# Patient Record
Sex: FEMALE | Race: BLACK OR AFRICAN AMERICAN | URBAN - METROPOLITAN AREA
[De-identification: names, ages, dates, MRNs, and addresses within clinical notes are randomized per-mention and may not be internally consistent; named-entity substitution may affect disease eponyms.]

---

## 2023-01-31 PROBLEM — K58.1 IRRITABLE BOWEL SYNDROME WITH CONSTIPATION: Status: ACTIVE | Noted: 2023-01-31

## 2023-01-31 PROBLEM — H61.23 IMPACTED CERUMEN OF BOTH EARS: Status: ACTIVE | Noted: 2023-01-31

## 2023-01-31 PROBLEM — E55.9 VITAMIN D INSUFFICIENCY: Status: ACTIVE | Noted: 2023-01-31

## 2023-01-31 PROBLEM — M79.673 PAINS, FOOT: Status: ACTIVE | Noted: 2023-01-31

## 2023-01-31 PROBLEM — L73.9 FOLLICULITIS: Status: ACTIVE | Noted: 2023-01-31

## 2023-01-31 PROBLEM — N18.31 STAGE 3A CHRONIC KIDNEY DISEASE (MULTI): Status: ACTIVE | Noted: 2023-01-31

## 2023-01-31 PROBLEM — R09.81 NASAL CONGESTION WITH RHINORRHEA: Status: ACTIVE | Noted: 2023-01-31

## 2023-01-31 PROBLEM — Z99.89 CPAP (CONTINUOUS POSITIVE AIRWAY PRESSURE) DEPENDENCE: Status: ACTIVE | Noted: 2023-01-31

## 2023-01-31 PROBLEM — E78.1 HIGH TRIGLYCERIDES: Status: ACTIVE | Noted: 2023-01-31

## 2023-01-31 PROBLEM — M41.9 SCOLIOSIS: Status: ACTIVE | Noted: 2023-01-31

## 2023-01-31 PROBLEM — R93.89 ABNORMAL FINDINGS ON IMAGING TEST: Status: ACTIVE | Noted: 2023-01-31

## 2023-01-31 PROBLEM — H61.20 EXCESSIVE CERUMEN IN EAR CANAL: Status: ACTIVE | Noted: 2023-01-31

## 2023-01-31 PROBLEM — R79.89 ELEVATED SERUM CREATININE: Status: ACTIVE | Noted: 2023-01-31

## 2023-01-31 PROBLEM — N92.1 PROLONGED MENSTRUATION: Status: ACTIVE | Noted: 2023-01-31

## 2023-01-31 PROBLEM — R05.9 COUGH: Status: ACTIVE | Noted: 2023-01-31

## 2023-01-31 PROBLEM — Z86.2 HISTORY OF ANEMIA: Status: ACTIVE | Noted: 2023-01-31

## 2023-01-31 PROBLEM — R73.01 IMPAIRED FASTING GLUCOSE: Status: ACTIVE | Noted: 2023-01-31

## 2023-01-31 PROBLEM — R21 SKIN RASH: Status: ACTIVE | Noted: 2023-01-31

## 2023-01-31 PROBLEM — N92.6 IRREGULAR MENSTRUAL CYCLE: Status: ACTIVE | Noted: 2023-01-31

## 2023-01-31 PROBLEM — R63.5 ABNORMAL WEIGHT GAIN: Status: ACTIVE | Noted: 2023-01-31

## 2023-01-31 PROBLEM — L70.9 ACNE: Status: ACTIVE | Noted: 2023-01-31

## 2023-01-31 PROBLEM — Q90.9 TRISOMY 21, DOWN SYNDROME (HHS-HCC): Status: ACTIVE | Noted: 2023-01-31

## 2023-01-31 PROBLEM — E88.819 INSULIN RESISTANCE: Status: ACTIVE | Noted: 2023-01-31

## 2023-01-31 PROBLEM — M40.46 EXAGGERATED LUMBAR LORDOSIS: Status: ACTIVE | Noted: 2023-01-31

## 2023-01-31 PROBLEM — J30.2 SEASONAL ALLERGIES: Status: ACTIVE | Noted: 2023-01-31

## 2023-01-31 PROBLEM — L83 ACANTHOSIS NIGRICANS: Status: ACTIVE | Noted: 2023-01-31

## 2023-01-31 PROBLEM — R94.120 ABNORMAL OTOACOUSTIC EMISSIONS TEST: Status: ACTIVE | Noted: 2023-01-31

## 2023-01-31 PROBLEM — N94.6 DYSMENORRHEA IN ADOLESCENT: Status: ACTIVE | Noted: 2023-01-31

## 2023-01-31 PROBLEM — J34.89 NASAL CONGESTION WITH RHINORRHEA: Status: ACTIVE | Noted: 2023-01-31

## 2023-01-31 PROBLEM — K59.00 CONSTIPATION: Status: ACTIVE | Noted: 2023-01-31

## 2023-01-31 PROBLEM — E78.6 LOW HDL (UNDER 40): Status: ACTIVE | Noted: 2023-01-31

## 2023-01-31 PROBLEM — M21.42 FLAT FEET, BILATERAL: Status: ACTIVE | Noted: 2023-01-31

## 2023-01-31 PROBLEM — K13.0 ANGULAR CHEILITIS: Status: ACTIVE | Noted: 2023-01-31

## 2023-01-31 PROBLEM — M21.41 FLAT FEET, BILATERAL: Status: ACTIVE | Noted: 2023-01-31

## 2023-01-31 PROBLEM — M21.40 ACQUIRED FLEXIBLE PES PLANUS: Status: ACTIVE | Noted: 2023-01-31

## 2023-01-31 PROBLEM — H69.90 ETD (EUSTACHIAN TUBE DYSFUNCTION): Status: ACTIVE | Noted: 2023-01-31

## 2023-01-31 PROBLEM — G47.33 OBSTRUCTIVE SLEEP APNEA SYNDROME: Status: ACTIVE | Noted: 2023-01-31

## 2023-01-31 PROBLEM — M54.9 BACK PAIN: Status: ACTIVE | Noted: 2023-01-31

## 2023-01-31 PROBLEM — J02.9 SORE THROAT: Status: ACTIVE | Noted: 2023-01-31

## 2023-01-31 PROBLEM — J01.90 ACUTE SINUSITIS: Status: ACTIVE | Noted: 2023-01-31

## 2023-01-31 PROBLEM — Q21.0 VSD (VENTRICULAR SEPTAL DEFECT) (HHS-HCC): Status: ACTIVE | Noted: 2023-01-31

## 2023-01-31 RX ORDER — CICLOPIROX 1 G/100ML
SHAMPOO TOPICAL NIGHTLY
COMMUNITY
Start: 2021-07-21

## 2023-01-31 RX ORDER — LUBIPROSTONE 24 UG/1
24 CAPSULE ORAL
COMMUNITY
Start: 2022-07-07 | End: 2023-04-25 | Stop reason: ALTCHOICE

## 2023-01-31 RX ORDER — POLYETHYLENE GLYCOL 3350 17 G/17G
17 POWDER, FOR SOLUTION ORAL
COMMUNITY
Start: 2014-12-10

## 2023-01-31 RX ORDER — IBUPROFEN 100 MG/5ML
SUSPENSION, ORAL (FINAL DOSE FORM) ORAL
COMMUNITY

## 2023-01-31 RX ORDER — KETOCONAZOLE 20 MG/ML
SHAMPOO, SUSPENSION TOPICAL
COMMUNITY
Start: 2021-07-27

## 2023-01-31 RX ORDER — DOCUSATE SODIUM 100 MG/1
100 CAPSULE, LIQUID FILLED ORAL NIGHTLY
COMMUNITY
Start: 2022-05-05 | End: 2023-04-25 | Stop reason: ALTCHOICE

## 2023-01-31 RX ORDER — CHOLECALCIFEROL (VITAMIN D3) 25 MCG
1 TABLET ORAL DAILY
COMMUNITY
Start: 2022-08-04 | End: 2023-04-25 | Stop reason: ALTCHOICE

## 2023-01-31 RX ORDER — CETIRIZINE HYDROCHLORIDE 10 MG/1
10 TABLET ORAL DAILY PRN
COMMUNITY
Start: 2021-05-29 | End: 2023-04-25 | Stop reason: ALTCHOICE

## 2023-01-31 RX ORDER — BENZOYL PEROXIDE 50 MG/ML
LIQUID TOPICAL
COMMUNITY
Start: 2022-07-28 | End: 2023-04-25 | Stop reason: ALTCHOICE

## 2023-01-31 RX ORDER — ACETAMINOPHEN 500 MG
1 TABLET ORAL
COMMUNITY
Start: 2018-11-07

## 2023-03-12 PROBLEM — L85.3 DRY SKIN DERMATITIS: Status: ACTIVE | Noted: 2023-03-12

## 2023-03-12 PROBLEM — E78.5 DYSLIPIDEMIA WITH HIGH LDL AND LOW HDL: Status: ACTIVE | Noted: 2023-03-12

## 2023-03-12 PROBLEM — G47.21 DELAYED SLEEP PHASE SYNDROME: Status: ACTIVE | Noted: 2023-03-12

## 2023-03-13 ENCOUNTER — APPOINTMENT (OUTPATIENT)
Dept: PRIMARY CARE | Facility: CLINIC | Age: 23
End: 2023-03-13
Payer: COMMERCIAL

## 2023-04-25 ENCOUNTER — OFFICE VISIT (OUTPATIENT)
Dept: PRIMARY CARE | Facility: CLINIC | Age: 23
End: 2023-04-25
Payer: COMMERCIAL

## 2023-04-25 VITALS
HEIGHT: 59 IN | HEART RATE: 82 BPM | BODY MASS INDEX: 39.47 KG/M2 | SYSTOLIC BLOOD PRESSURE: 120 MMHG | OXYGEN SATURATION: 97 % | DIASTOLIC BLOOD PRESSURE: 70 MMHG | WEIGHT: 195.8 LBS

## 2023-04-25 DIAGNOSIS — Q21.0 VSD (VENTRICULAR SEPTAL DEFECT) (HHS-HCC): ICD-10-CM

## 2023-04-25 DIAGNOSIS — K58.1 IRRITABLE BOWEL SYNDROME WITH CONSTIPATION: Primary | ICD-10-CM

## 2023-04-25 DIAGNOSIS — G47.33 OBSTRUCTIVE SLEEP APNEA SYNDROME: ICD-10-CM

## 2023-04-25 DIAGNOSIS — H61.21 IMPACTED CERUMEN OF RIGHT EAR: ICD-10-CM

## 2023-04-25 DIAGNOSIS — N18.2 STAGE 2 CHRONIC KIDNEY DISEASE: ICD-10-CM

## 2023-04-25 DIAGNOSIS — Q90.9 TRISOMY 21, DOWN SYNDROME (HHS-HCC): ICD-10-CM

## 2023-04-25 PROCEDURE — 1036F TOBACCO NON-USER: CPT | Performed by: PHYSICIAN ASSISTANT

## 2023-04-25 PROCEDURE — 99214 OFFICE O/P EST MOD 30 MIN: CPT | Performed by: PHYSICIAN ASSISTANT

## 2023-04-25 RX ORDER — LORATADINE 10 MG/1
10 TABLET ORAL DAILY
COMMUNITY

## 2023-04-25 ASSESSMENT — PATIENT HEALTH QUESTIONNAIRE - PHQ9
SUM OF ALL RESPONSES TO PHQ9 QUESTIONS 1 AND 2: 0
1. LITTLE INTEREST OR PLEASURE IN DOING THINGS: NOT AT ALL
2. FEELING DOWN, DEPRESSED OR HOPELESS: NOT AT ALL

## 2023-04-25 NOTE — PROGRESS NOTES
"Subjective   Maricruz Estrada is a 22 y.o. female who presents for Follow-up (Stomach bothering her on and off).    HPI 22-year-old female presenting with her mother as new patient for establishment of care.  Former patient of colleague, Dr. Rudd, last seen November 2022.  She has a history of trisomy 21 and therefore is a poor historian.  Patient's mother offers most of the history.     Patient's mother states a few days ago she pressed on Maricruz's abdomen (she thinks LUQ) and she said it hurt her. She also believes that she may be slightly bloated. Her BMs are usually soft like a \"mud pie,\" but she grunts when she has a BM per mother. She denies any blood in the stool or black stool (Maricruz sends mom pictures on her phone when she goes). She does follow with GI, last seen 6/17/2022. On MiraLAX     VSD: Follows with [facility], last seen several years ago and plans to schedule a follow-up.    REFUGIO: compliant with CPAP use. Previously followed with sleep medicine    CKD, stage II: Following with [facility], Dr. Marga Key, last seen 5 days ago. CMP 5/5/2022 showed GFR 76, creatinine 1.07, BUN 11. She updated labs at her appt, but they are not able to be reviewed yet.    /70   Pulse 82   Ht 1.499 m (4' 11\")   Wt 88.8 kg (195 lb 12.8 oz)   SpO2 97%   BMI 39.55 kg/m²   Objective   Physical Exam  Vitals reviewed.   Constitutional:       General: She is not in acute distress.     Appearance: Normal appearance. She is not ill-appearing.   HENT:      Head: Normocephalic and atraumatic.      Right Ear: There is impacted cerumen.      Left Ear: There is no impacted cerumen.   Eyes:      General: No scleral icterus.     Extraocular Movements: Extraocular movements intact.      Conjunctiva/sclera: Conjunctivae normal.      Pupils: Pupils are equal, round, and reactive to light.   Cardiovascular:      Rate and Rhythm: Normal rate and regular rhythm.      Heart sounds: Normal heart sounds. No murmur " heard.     No friction rub. No gallop.   Pulmonary:      Effort: Pulmonary effort is normal. No respiratory distress.      Breath sounds: Normal breath sounds. No stridor. No wheezing, rhonchi or rales.   Musculoskeletal:      Cervical back: Normal range of motion.      Right lower leg: No edema.      Left lower leg: No edema.   Skin:     General: Skin is warm and dry.   Neurological:      Mental Status: She is alert and oriented to person, place, and time. Mental status is at baseline.      Cranial Nerves: No cranial nerve deficit.      Gait: Gait normal.   Psychiatric:         Mood and Affect: Mood normal.         Behavior: Behavior normal.       Assessment/Plan   Problem List Items Addressed This Visit       Impacted cerumen of right ear     Debrox eardrops sent to pharmacy.  Encouraged to schedule ENT appointment if no success with eardrops         Relevant Medications    carbamide peroxide (Debrox) 6.5 % otic solution    Irritable bowel syndrome with constipation - Primary     On exam, soft nontender abdomen.  Continue use of MiraLAX daily to promote regular, soft bowel movements.  Follow-up with GI         Obstructive sleep apnea syndrome     Compliant with nightly CPAP use.  Follows with sleep medicine         Trisomy 21, Down syndrome     Stable, independent with ADLs. Lives with her supportive family.          VSD (ventricular septal defect)     Stable. Following with [facility] cardiology, will schedule an appt         Stage 2 chronic kidney disease     Stable, following with [facility] nephrology.  Labs updated at her last appointment 5 days ago.  Avoid nephrotoxic medications, drink adequate water

## 2023-04-27 PROBLEM — N18.31 STAGE 3A CHRONIC KIDNEY DISEASE (MULTI): Status: RESOLVED | Noted: 2023-01-31 | Resolved: 2023-04-27

## 2023-04-27 PROBLEM — R21 SKIN RASH: Status: RESOLVED | Noted: 2023-01-31 | Resolved: 2023-04-27

## 2023-04-27 PROBLEM — J02.9 SORE THROAT: Status: RESOLVED | Noted: 2023-01-31 | Resolved: 2023-04-27

## 2023-04-27 PROBLEM — N18.2 STAGE 2 CHRONIC KIDNEY DISEASE: Status: ACTIVE | Noted: 2023-04-27

## 2023-04-27 PROBLEM — H61.20 EXCESSIVE CERUMEN IN EAR CANAL: Status: RESOLVED | Noted: 2023-01-31 | Resolved: 2023-04-27

## 2023-04-27 PROBLEM — H61.21 IMPACTED CERUMEN OF RIGHT EAR: Status: ACTIVE | Noted: 2023-01-31

## 2023-04-27 NOTE — ASSESSMENT & PLAN NOTE
Stable, following with [facility] nephrology.  Labs updated at her last appointment 5 days ago.  Avoid nephrotoxic medications, drink adequate water

## 2023-04-27 NOTE — ASSESSMENT & PLAN NOTE
On exam, soft nontender abdomen.  Continue use of MiraLAX daily to promote regular, soft bowel movements.  Follow-up with GI

## 2023-04-27 NOTE — ASSESSMENT & PLAN NOTE
Debrox eardrops sent to pharmacy.  Encouraged to schedule ENT appointment if no success with eardrops

## 2023-10-24 ENCOUNTER — APPOINTMENT (OUTPATIENT)
Dept: PRIMARY CARE | Facility: CLINIC | Age: 23
End: 2023-10-24
Payer: COMMERCIAL

## 2023-11-17 ENCOUNTER — APPOINTMENT (OUTPATIENT)
Dept: PRIMARY CARE | Facility: CLINIC | Age: 23
End: 2023-11-17
Payer: COMMERCIAL

## 2023-11-29 ENCOUNTER — OFFICE VISIT (OUTPATIENT)
Dept: PRIMARY CARE | Facility: CLINIC | Age: 23
End: 2023-11-29
Payer: COMMERCIAL

## 2023-11-29 VITALS
WEIGHT: 197 LBS | DIASTOLIC BLOOD PRESSURE: 78 MMHG | SYSTOLIC BLOOD PRESSURE: 122 MMHG | HEIGHT: 59 IN | BODY MASS INDEX: 39.72 KG/M2 | OXYGEN SATURATION: 98 % | HEART RATE: 85 BPM

## 2023-11-29 DIAGNOSIS — Z76.89 ENCOUNTER TO ESTABLISH CARE: Primary | ICD-10-CM

## 2023-11-29 DIAGNOSIS — Z00.00 PREVENTATIVE HEALTH CARE: ICD-10-CM

## 2023-11-29 PROCEDURE — 1036F TOBACCO NON-USER: CPT | Performed by: STUDENT IN AN ORGANIZED HEALTH CARE EDUCATION/TRAINING PROGRAM

## 2023-11-29 PROCEDURE — 99395 PREV VISIT EST AGE 18-39: CPT | Performed by: STUDENT IN AN ORGANIZED HEALTH CARE EDUCATION/TRAINING PROGRAM

## 2023-11-29 NOTE — PROGRESS NOTES
Subjective   Patient ID: Maricruz Estrada is a 23 y.o. female CKD II, VSD, Down syndrome, obesity, and sleep apnea who presents to Establish Care.    HPI  Concerns today:  #Weight  -Mom states that she recently beat breast cancer, dad has had multiple cancers, so have not been working out as much  -Plans to start exercising more and eating fewer carbs    Chronic issues:  #CKD stage II  -Saw Dr. Wakefield at [facility] 10/12/23  -Plans to continue to monitor RFP, CBC, vitamin D level, PTH and Cystatin C every 6 months    #VSD  -echo 12/2/21--Moderate sized perimembranous VSD, functionally tiny due to significant redundant tricuspid valve tissue obstructing the VSD with two tiny jets of residual flow.   -Last seen by Dolly Higginbotham on 12/2/21, cardiologist at [facility]   -Due for follow up next year     #Obesity  -[facility] at [facility]-- seen in June 2023, Dr. Barfield  -Initial weight loss of about 15 lbs then stopped with the recent cancer diagnosis in the family   -Working on sleep hygiene, improving physical activity-- planning to walk on track, mindful eating, fewer carbs   -TSH, celiac screen, liver enzymes, HgbA1c, fasting lipids reassuring    #Sleep apnea  -Uses CPAP every night    #Down syndrome  1. Behavior-- keep an eye on any possible regression, maybe some forgetfulness, no change in personality  2. Dementia-- no issues  3. Diabetes-- A1c 4.6 in June 2023   4. Cardiovascular Disease-- due for cardiology follow up, see above  5. 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 Osteoporosis-- no issues  8. Thyroid- TSH 0.797 on 6/23/23   9. Celiac Disease- Tested and negative in June 2023     Health Maintenance:  Colon cancer screening: Colonoscopy at 45?-- will need to ask father's age when diagnosed with colon cancer   Breast cancer screening: Mammogram at 40? -- will need to ask mother what age she was when diagnosed with breast cancer  Cervical cancer screening: Pap/HPV due, discussed  "risks and benefits, patient has never been sexually active, may benefit from sedation, will continue discussion   Immunizations: Up to date other than COVID vaccine, received flu vaccine, will be due for Tdap next year     Social history:  -Lives with mom, dad, and  32yo brother at home, dog  -Has 3 brothers total  -Goes to [facility] to walk  -Interested in boys, has not dated, likes celebrities, currently likes [employer] Armenta  -Planning to eat healthier and exercise more  -Never smoker  -Overall friendly and happy- forgetting a little, but mom states this is not a huge change or regression  -Trying to get into a day program, states that she would like to work but she needs supervision, so hard to get a position  -Likes singing and dancing, ipad    Family history:  -Mom with breast cancer and brain aneurysm  -Dad with colon cancer, prostate cancer, and pancreatic cancer       Objective   Visit Vitals  /78   Pulse 85   Ht 1.499 m (4' 11\")   Wt 89.4 kg (197 lb)   SpO2 98%   BMI 39.79 kg/m²   Smoking Status Never   BSA 1.93 m²      Physical Exam  General: Well appearing, conversational, in no acute distress  HEENT: EOMI, PERRL, nares patent without congestion, MMM, TMs clear bilaterally   CV: RRR, no murmurs  Resp: Lungs CTAB, normal work of breathing  GI: Soft, nondistended, nontender, BS+   Ext: No lower ext swelling  Skin: Warm, dry, no rashes  Neuro: Awake, alert, oriented x3, moving all 4 extremities, nonfocal, normal gait, ambulates without assistance  Psych: Appropriate mood and affect      Assessment/Plan   Maricruz Estrada is a 23 y.o. female CKD II, VSD, Down syndrome, obesity, and sleep apnea who presents to [facility]. Exam reassuring today. Discussed getting COVID vaccine. Due for cardiology follow-up, otherwise up to date on follow-ups and preventative screenings. Briefly discussed pap smear today, will discuss more in the future.  Problem List Items Addressed This Visit    None  Visit " Diagnoses       Encounter to establish care    -  Primary                 Mee Quick MD MPH

## 2023-11-29 NOTE — PATIENT INSTRUCTIONS
Thank you for coming today Maricruz! It was so nice meeting you!    Please check out some of the resources from [employer]. I will also ask around about day programs.  [email or web address]      I will see you in 6 months, and we will do the Tdap vaccine and lab work at that time.    We will call once the guardianship paperwork is filled out.    We can discuss pap smear again in the future as well. I think ultimately you should establish with gynecology, but this can be sometime in the next few years, nothing urgent.     Please send a message or come in sooner if you are sick or need anything!

## 2024-01-05 ENCOUNTER — TELEPHONE (OUTPATIENT)
Dept: PRIMARY CARE | Facility: CLINIC | Age: 24
End: 2024-01-05
Payer: COMMERCIAL

## 2024-01-29 ENCOUNTER — OFFICE VISIT (OUTPATIENT)
Dept: PRIMARY CARE | Facility: CLINIC | Age: 24
End: 2024-01-29
Payer: COMMERCIAL

## 2024-01-29 VITALS
OXYGEN SATURATION: 98 % | WEIGHT: 194 LBS | DIASTOLIC BLOOD PRESSURE: 80 MMHG | SYSTOLIC BLOOD PRESSURE: 110 MMHG | HEART RATE: 80 BPM | BODY MASS INDEX: 39.18 KG/M2

## 2024-01-29 DIAGNOSIS — R60.0 BILATERAL LOWER EXTREMITY EDEMA: Primary | ICD-10-CM

## 2024-01-29 PROCEDURE — 99213 OFFICE O/P EST LOW 20 MIN: CPT | Performed by: STUDENT IN AN ORGANIZED HEALTH CARE EDUCATION/TRAINING PROGRAM

## 2024-01-29 PROCEDURE — 1036F TOBACCO NON-USER: CPT | Performed by: STUDENT IN AN ORGANIZED HEALTH CARE EDUCATION/TRAINING PROGRAM

## 2024-01-29 NOTE — PROGRESS NOTES
Subjective   Patient ID: Maricruz Estrada is a 23 y.o. female CKD II, VSD, Down syndrome, obesity, and sleep apnea who presents to Follow-up.    HPI  Concerns today:  #Leg swelling  -Mom worried about blood clots  -Mom and grandmother had blood clots recently, so she is worried that Maricruz has clots  -States that Maricruz's legs look thicker bilaterally  -Maricruz denies any pain or SOB, states she feels well and has been going to the gym   -No issues with breathing   -Mom also has brain aneurysms and is worried Maricruz could have those     Chronic issues:  #Weight  -Mom states that she recently beat breast cancer, dad has had multiple cancers, so have not been working out as much  -Plans to start exercising more and eating fewer carbs    #CKD stage II  -Saw Dr. Wakefield at [facility] 10/12/23  -Plans to continue to monitor RFP, CBC, vitamin D level, PTH and Cystatin C every 6 months    #VSD  -echo 12/2/21--Moderate sized perimembranous VSD, functionally tiny due to significant redundant tricuspid valve tissue obstructing the VSD with two tiny jets of residual flow.   -Last seen by Dolly Higginbotham on 12/2/21, cardiologist at [facility]   -Due for follow up next year     #Obesity  -[facility] at [facility]-- seen in June 2023, Dr. Barfield  -Initial weight loss of about 15 lbs then stopped with the recent cancer diagnosis in the family   -Working on sleep hygiene, improving physical activity-- planning to walk on track, mindful eating, fewer carbs   -TSH, celiac screen, liver enzymes, HgbA1c, fasting lipids reassuring    #Sleep apnea  -Uses CPAP every night    #Down syndrome  1. Behavior-- keep an eye on any possible regression, maybe some forgetfulness, no change in personality  2. Dementia-- no issues  3. Diabetes-- A1c 4.6 in June 2023   4. Cardiovascular Disease-- due for cardiology follow up, see above  5. 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 Osteoporosis-- no issues  8. Thyroid- TSH  0.797 on 6/23/23   9. Celiac Disease- Tested and negative in June 2023     Health Maintenance:  Colon cancer screening: Colonoscopy will be due around age 39-- father was 49 when diagnosed with colon/prostate cancer, in 2023 he was diagnosed with pancreatic cancer  Breast cancer screening: Mammogram at 40, mom diagnosed at age 54  Cervical cancer screening: Pap/HPV due, discussed risks and benefits, patient has never been sexually active, may benefit from sedation, will continue discussion   Immunizations: Up to date other than COVID vaccine, received flu vaccine, will be due for Tdap next year     Social history:  -Lives with mom, dad, and  32yo brother at home, dog  -Has 3 brothers total  -Goes to [facility] to walk  -Interested in boys, has not dated, likes celebriHachiko, currently likes Mohit Armenta  -Planning to eat healthier and exercise more  -Never smoker  -Overall friendly and happy- forgetting a little, but mom states this is not a huge change or regression  -Trying to get into a day program, states that she would like to work but she needs supervision, so hard to get a position  -Likes singing and dancing, ipad    Family history:  -Mom with breast cancer and brain aneurysm  -Dad with colon cancer, prostate cancer, and pancreatic cancer       Objective   Visit Vitals  /80   Pulse 80   Wt 88 kg (194 lb)   SpO2 98%   BMI 39.18 kg/m²   Smoking Status Never   BSA 1.91 m²      Physical Exam  General: Well appearing young woman with trisomy 21, conversational, answers simple questions, in no acute distress  HEENT: EOMI, PERRL, nares patent without congestion, MMM  CV: RRR, no murmurs  Resp: Lungs CTAB, normal work of breathing  GI: Soft, nondistended  Ext: Lower extremities symmetric without any pitting edema   Skin: Warm, dry, no rashes  Neuro: Awake, alert, oriented x3, moving all 4 extremities, nonfocal, normal gait, ambulates without assistance  Psych: Appropriate mood and affect       Assessment/Plan   Maricruz Estrada is a 23 y.o. female CKD II, VSD, Down syndrome, obesity, and sleep apnea who presents for bilateral leg swelling. Her exam appears at baseline, however mom concerned her legs are swollen bilaterally. Due to family history of clots and concern, we will get ultrasound to rule out DVTs, though I have low suspicion.     Due for cardiology follow-up, otherwise up to date on follow-ups and preventative screenings.    Will continue to discuss potential MRA screening for aneurysm as only one first degree relative    Problem List Items Addressed This Visit    None  Visit Diagnoses       Bilateral lower extremity edema    -  Primary    Relevant Orders    Vascular US lower extremity venous duplex bilateral               Mee Quick MD MPH

## 2024-01-29 NOTE — PATIENT INSTRUCTIONS
Thank you for coming today Maricruz!    Please get ultrasounds of your legs. You can get this done on the lower level in [address]. The phone number is [phone].     I will reach out regarding aneurysm screening.    Have a great evening!

## 2024-01-31 ENCOUNTER — HOSPITAL ENCOUNTER (OUTPATIENT)
Dept: VASCULAR MEDICINE | Facility: CLINIC | Age: 24
Discharge: HOME | End: 2024-01-31
Payer: COMMERCIAL

## 2024-01-31 DIAGNOSIS — R60.0 BILATERAL LOWER EXTREMITY EDEMA: ICD-10-CM

## 2024-01-31 PROCEDURE — 93970 EXTREMITY STUDY: CPT

## 2024-01-31 PROCEDURE — 93970 EXTREMITY STUDY: CPT | Mod: MUE | Performed by: SURGERY

## 2024-03-21 ENCOUNTER — TELEPHONE (OUTPATIENT)
Dept: PRIMARY CARE | Facility: CLINIC | Age: 24
End: 2024-03-21

## 2024-12-12 ENCOUNTER — OFFICE VISIT (OUTPATIENT)
Dept: PRIMARY CARE | Facility: CLINIC | Age: 24
End: 2024-12-12
Payer: MEDICARE

## 2024-12-12 VITALS
SYSTOLIC BLOOD PRESSURE: 110 MMHG | WEIGHT: 204 LBS | OXYGEN SATURATION: 98 % | DIASTOLIC BLOOD PRESSURE: 80 MMHG | BODY MASS INDEX: 41.2 KG/M2 | HEART RATE: 86 BPM

## 2024-12-12 DIAGNOSIS — J18.9 ATYPICAL PNEUMONIA: Primary | ICD-10-CM

## 2024-12-12 PROCEDURE — G2211 COMPLEX E/M VISIT ADD ON: HCPCS | Performed by: STUDENT IN AN ORGANIZED HEALTH CARE EDUCATION/TRAINING PROGRAM

## 2024-12-12 PROCEDURE — 99213 OFFICE O/P EST LOW 20 MIN: CPT | Performed by: STUDENT IN AN ORGANIZED HEALTH CARE EDUCATION/TRAINING PROGRAM

## 2024-12-12 RX ORDER — AZITHROMYCIN 250 MG/1
TABLET, FILM COATED ORAL
Qty: 6 TABLET | Refills: 0 | Status: SHIPPED | OUTPATIENT
Start: 2024-12-12 | End: 2024-12-17

## 2024-12-12 NOTE — PATIENT INSTRUCTIONS
Thank you for coming today Maricruz!    Please start azithromycin 2 tablets the first day, then one tablet daily for 4 days for atypical pneumonia (mycoplasma).    
Fellow

## 2024-12-12 NOTE — PROGRESS NOTES
"Subjective   Patient ID: Maricruz Estrada is a 24 y.o. female  with past medical history of CKD II, VSD, Down syndrome, obesity, and sleep apnea presenting with non-productive cough for 2-3 weeks. She is accompanied by mom and dad. She went to urgent care in November and was prescribed guaifenesin. Overall cough has improved. They did strep test which was negative. Endorse rhinorrhea. No fever, muscle aches, nausea, vomiting, shortness of breath and fatigue. Dad developed cough after Maricruz which has since resolved. No history of pneumonia or frequent infections.      Objective       5/5/2022    12:59 PM 8/4/2022     2:59 PM 11/3/2022     1:22 PM 4/25/2023     1:55 PM 11/29/2023    11:11 AM 1/29/2024     2:52 PM 12/12/2024     2:01 PM   Vitals   Systolic 114 108 103 120 122 110 110   Diastolic 66 64 68 70 78 80 80   BP Location       Right arm   Heart Rate 91 69 72 82 85 80 86   Temp 36.2 °C (97.2 °F) 36.2 °C (97.2 °F) 36.2 °C (97.2 °F)       Resp  18        Height 1.581 m (5' 2.25\") 1.511 m (4' 11.49\") 1.499 m (4' 11\") 1.499 m (4' 11\") 1.499 m (4' 11\")     Weight (lb) 198 197.53 190.13 195.8 197 194 204   BMI 35.92 kg/m2 39.24 kg/m2 38.4 kg/m2 39.55 kg/m2 39.79 kg/m2 39.18 kg/m2 41.2 kg/m2   BSA (m2) 1.99 m2 1.94 m2 1.89 m2 1.92 m2 1.93 m2 1.91 m2 1.96 m2   Visit Report    Report Report Report Report       Physical Exam  General: Well appearing young woman with trisomy 21, conversational, answer questions, in no acute distress  HEENT: EOMI. Cerumen bilaterally.  CV: RRR, no murmurs  Resp: Lungs CTAB, normal work of breathing. Coughing on exam.  GI: Soft, nondistended  Skin: Warm, dry, no rashes  Neuro: Awake, alert, moving all 4 extremities,   Psych: Appropriate mood and affect      Assessment/Plan    Maricruz Estrada is a 24 y.o. female  with past medical history of CKD II, VSD, Down syndrome, obesity, and sleep apnea presenting with non-productive cough for 2-3 weeks. Symptoms likely due to viral URI with " lingering cough. Discussed with mom that cough from URI can often last 4-6 weeks. No fever, shortness of breath or fatigue concerning for bacterial infection, however given that mycoplasma pneumonia is prevalent right now, will give azithromycin to cover.     #Cough  -Azithromycin x 5 days  -Supportive care.    Please follow up in 1-2 months for annual visit.    Patient seen and staffed with Dr. Abdelrahman Lang MD  Internal Medicine-Pediatrics PGY2  Epic Chat

## 2024-12-30 ENCOUNTER — TELEPHONE (OUTPATIENT)
Dept: PRIMARY CARE | Facility: CLINIC | Age: 24
End: 2024-12-30
Payer: COMMERCIAL

## 2024-12-30 DIAGNOSIS — R05.2 SUBACUTE COUGH: Primary | ICD-10-CM

## 2024-12-31 ENCOUNTER — HOSPITAL ENCOUNTER (OUTPATIENT)
Dept: RADIOLOGY | Facility: CLINIC | Age: 24
Discharge: HOME | End: 2024-12-31
Payer: MEDICARE

## 2024-12-31 DIAGNOSIS — R05.2 SUBACUTE COUGH: ICD-10-CM

## 2024-12-31 PROCEDURE — 71046 X-RAY EXAM CHEST 2 VIEWS: CPT | Performed by: RADIOLOGY

## 2024-12-31 PROCEDURE — 71046 X-RAY EXAM CHEST 2 VIEWS: CPT

## 2025-01-21 ENCOUNTER — APPOINTMENT (OUTPATIENT)
Dept: GENETICS | Facility: CLINIC | Age: 25
End: 2025-01-21
Payer: COMMERCIAL

## 2025-01-21 DIAGNOSIS — Z80.0 FAMILY HISTORY OF PANCREATIC CANCER: ICD-10-CM

## 2025-01-21 DIAGNOSIS — Q90.9 TRISOMY 21, DOWN SYNDROME (HHS-HCC): Primary | ICD-10-CM

## 2025-01-21 DIAGNOSIS — N18.9 CHRONIC KIDNEY DISEASE, UNSPECIFIED CKD STAGE: ICD-10-CM

## 2025-01-21 DIAGNOSIS — Z80.42 FAMILY HISTORY OF PROSTATE CANCER: ICD-10-CM

## 2025-01-21 DIAGNOSIS — Z80.0 FAMILY HISTORY OF COLON CANCER: ICD-10-CM

## 2025-01-21 NOTE — PROGRESS NOTES
MEDICAL GENETICS FOLLOW-UP VISIT NOTE    Patient full name: Maricruz Estrada  MRN: [id number]  YOB: 2000  Present during this visit: The mother and patient    Primary care provider: Mee Quick MD MPH    Medical history was obtained from the mother. Prior to this appointment, I reviewed medical records from outpatient medical records and scanned documents, if available. This visit was completed via telephone. It was initially scheduled as a virtual visit but after several attempts to connect, we switched the visit to telephone. All issues as below were discussed and addressed but no physical exam was performed. If it was felt that the patient should be evaluated in clinic then they were directed there. The patient consented to visit.    History of present illness  To summarize, Ms. Estrada is a 24-year-old female who was referred to Genetics for evaluation of cancer predisposition syndrome. She is generally healthy with a diagnosis of Down syndrome, with history of VSD, REFUGIO, vitamin D deficiency, lordosis, insulin resistance, mild bilateral high frequency SNHL, and CKD stage 3. Last visit with Genetics 2022.     Cancer history:  - Prior history of cancer: None  - Colonoscopy/EGD: None  - MMG/US: None  - Parent and patient decided not to have a pap smear because she is not sexually active.      Gynecologic history:  - .   - Menarche 2017.  - No previous ovarian surgery/hysterectomy.   - No HRT/OCP    Interval history  - Mother reports no significant changes in her health. She is doing well.   - No new personal history or family history of cancer.   - After the last visit, we learned that her father, who has cancers, had been tested for cancer gene mutations and the test came back negative in him. He is currently doing well.   - She is having genetic testing for kidney disease, ordered by her nephrologist.     ROS -   - no breast symptoms.  - no GI symptoms. Used to have constipation, now  "improved.   - denies musculoskeletal issues, no history of fracture.   - last hearing in , with mild SNHL.     SH - she is to have a new job this summer.     Family history:   Three-generation pedigree was obtained and scanned to chart, under \"Genetics\" folder.  Pertinent history   No sib.   Mom (54) with brain aneurysm at 34, unruptured. s/p 7 hernia repairs.   No cancer history in maternal side. First cousin once removed with Down syndrome.   Father (54) was diagnosed with colon and prostate cancer in 40s. He was diagnosed with pancreatic cancer last year (53) s/p RT, chemo, Whipple sx. Genetic testing was negative, done at . He has two half sibs, one of whom was diagnosed with cancer (unknown type) at 55 years old.   PGF (76) with non-metastatic prostate cancer in 60s. He has 9 sibs, one with colon cancer diagnosed at 68. One of his sisters' son (proband's first cousin once removed)  at 51 due to lung cancer, reportedly with h/o nicotine use.   PGF's mother passed away due to leukemia at 82.     Paternal side: AA  Maternal side: AA  Ashkenazi Oriental orthodox: Denied  Consanguinity: Denied     Assessment:    ###FH of cancer  Ms. Estrada is a 24-year-old female who has family history of cancer in following family members: father (colon and prostate cancer at 40s then pancreatic cancer at 53), PGF with prostate cancer (60s), paternal great uncle with colon cancer (68), paternal great grandmother with leukemia (82), and first cousin once removed who used nicotine in father's side with lung cancer (51).     There is no genetic testing indication in her given no cancer confirmed in maternal side and no identified genetic mutation in her father, who has three cancers.     Although genetic testing is not indicated in her, age-appropriate cancer screening (such as mammogram) and other preventative measures such as sunscreen use and no smoking are recommended.     Her father could reach out to his genetics provider and " discuss whether additional, updated testing is recommended.     Although the results of her father's genetic testing were negative, Ms. Estrada has increased risk of developing colon cancer and panceratic cancer, when compared to general population. This is due to the potential multifactorial nature of the cancers.    - I reviewed that increased awareness is important. She should reach out to her providers for any worsening and/or chronic GI symptoms.   - For colon cancer risk: The NCCN guidelines recommend colonoscopy at 40 and repeating every 5 years if normal.   - For pancreatic cancer risk: She does not meet the criteria for pancreatic cancer screening per NCCN because she has one family member with pancreatic cancer. I reviewed that the self-pay FAST MRI is an option for asymptomatic individuals aged 25 to 75 with a first-degree relative with pancreatic cancer.     Plan:  - No genetic testing indication given negative testing in father.   - Age-appropriate cancer screening per PCP. Screening colonoscopy at 40 per NCCN.   - Information regarding the self-pay MRI of the pancreas (FAST MRI) to be mailed to her address.   - Father could reach out to his genetics provider to review whether updated testing is available. If interested, he may give me a permission to review his chart.   - I encouraged the mother to keep me updated if there are any changes to Ms. Estrada's personal or family history of cancer.     ###Personal history of Down syndrome  I did not examine her since this is a telephone visit. I reviewed the guidelines: Medical Care of Adults With Down Syndrome  A Clinical Guideline (PMID: 60909846) with mother. She is doing well and has been having regular TSH and HbA1C check. She does not have GI symptoms; I reviewed increased risk of celiac disease. Her last hearing showed mild hearing loss in 2020. Mother will make an appointment for a hearing test. She will see Cardiology (Dr. Dolly Higginbotham) in the  near future given history of congenital heart differences. Mother could discuss with PCP whether an x-ray of the spine is indicated to detect scoliosis. She reports no back pain or change in body posture. She is seeing Nephrology for CKD.     ###Chronic kidney disease  She is undergoing genetic testing with her nephrologist at [facility]. Renal US and CT scan did not identify any structural differences. She may have a positive genetic finding. For example, APOL1 variants are common predisposing risk factors of CKD in patients with  ancestry. I reviewed with the mother that if the results are positive, they can reach out to me to discuss further if interested.     RTC  3-5 years to review cancer risk management and health maintenance for individuals with Down syndrome.     Hang Christy MD    Medical Geneticist  [facility]  Phone: [phone]  Fax: [phone]   Address: [address] [place] [address]  Time spent (this information is required by insurance): Encounter time 46min; preparation 5min; documentation 20min; total time spent 71min

## 2025-03-13 ENCOUNTER — APPOINTMENT (OUTPATIENT)
Dept: PRIMARY CARE | Facility: CLINIC | Age: 25
End: 2025-03-13
Payer: COMMERCIAL

## 2025-03-13 VITALS
BODY MASS INDEX: 41.43 KG/M2 | OXYGEN SATURATION: 92 % | DIASTOLIC BLOOD PRESSURE: 63 MMHG | SYSTOLIC BLOOD PRESSURE: 107 MMHG | HEIGHT: 60 IN | HEART RATE: 74 BPM | WEIGHT: 211 LBS

## 2025-03-13 DIAGNOSIS — H91.93 BILATERAL HEARING LOSS, UNSPECIFIED HEARING LOSS TYPE: ICD-10-CM

## 2025-03-13 DIAGNOSIS — L21.9 SEBORRHEA: Primary | ICD-10-CM

## 2025-03-13 PROCEDURE — 3008F BODY MASS INDEX DOCD: CPT | Performed by: STUDENT IN AN ORGANIZED HEALTH CARE EDUCATION/TRAINING PROGRAM

## 2025-03-13 PROCEDURE — 99213 OFFICE O/P EST LOW 20 MIN: CPT | Performed by: STUDENT IN AN ORGANIZED HEALTH CARE EDUCATION/TRAINING PROGRAM

## 2025-03-13 RX ORDER — TACROLIMUS 1 MG/G
1 OINTMENT TOPICAL
COMMUNITY
Start: 2019-08-15

## 2025-03-13 RX ORDER — FLUOCINOLONE ACETONIDE 0.11 MG/ML
OIL TOPICAL
COMMUNITY
Start: 2019-08-13

## 2025-03-13 RX ORDER — KETOCONAZOLE 20 MG/G
CREAM TOPICAL 2 TIMES DAILY
Qty: 60 G | Refills: 0 | Status: SHIPPED | OUTPATIENT
Start: 2025-03-13 | End: 2025-04-10

## 2025-03-13 RX ORDER — KETOCONAZOLE 20 MG/ML
SHAMPOO, SUSPENSION TOPICAL 2 TIMES WEEKLY
Qty: 120 ML | Refills: 3 | Status: SHIPPED | OUTPATIENT
Start: 2025-03-13 | End: 2025-03-27

## 2025-03-13 ASSESSMENT — PAIN SCALES - GENERAL: PAINLEVEL_OUTOF10: 0-NO PAIN

## 2025-03-13 ASSESSMENT — ENCOUNTER SYMPTOMS
OCCASIONAL FEELINGS OF UNSTEADINESS: 0
LOSS OF SENSATION IN FEET: 0
DEPRESSION: 0

## 2025-03-13 ASSESSMENT — PATIENT HEALTH QUESTIONNAIRE - PHQ9
1. LITTLE INTEREST OR PLEASURE IN DOING THINGS: NOT AT ALL
2. FEELING DOWN, DEPRESSED OR HOPELESS: NOT AT ALL
SUM OF ALL RESPONSES TO PHQ9 QUESTIONS 1 AND 2: 0

## 2025-03-13 NOTE — PATIENT INSTRUCTIONS
Thank you for coming today Maricruz!    You can call [phone] to schedule with Annelise Sahu for her ears.     We will try ketoconazole cream (antifungal) twice a day on her face first.     The other creams that she was prescribed before were tacrolimus (psoriasis) and triamcinolone (steroid).

## 2025-03-13 NOTE — PROGRESS NOTES
"  Subjective   Patient ID: Maricruz Estrada is a 24 y.o. female  with past medical history of CKD II, VSD, Down syndrome, obesity, and sleep apnea presenting for Follow-up    Concerns today:  #Skin breakout  The patient presents with a skin breakout. She reports itching and a flare-up on her face of seborrhea. The patient has a history of psoriasis on the scalp, which is currently not flared up. Previously, she was prescribed various topical treatments including ketoconazole shampoo, fluocinonide oil for overnight use, Metrocream, tacrolimus ointment, and triamcinolone. In June 2023, she was prescribed desonide ointment twice daily for 5 days for a flare-up, followed by ketoconazole twice daily for maintenance. The patient reports that Desonide was not effective in managing her symptoms this time.    #Cough  The patient's mom mentions a recent cough that started last week. She took Mucinex, which helped alleviate the symptoms.    #Hearing issues  The patient has a history of hearing issues. She currently has wax buildup in her ears that may be affecting her hearing.        Objective       8/4/2022     2:59 PM 11/3/2022     1:22 PM 4/25/2023     1:55 PM 11/29/2023    11:11 AM 1/29/2024     2:52 PM 12/12/2024     2:01 PM 3/13/2025     2:03 PM   Vitals   Systolic 108 103 120 122 110 110 107   Diastolic 64 68 70 78 80 80 63   BP Location      Right arm Right arm   Heart Rate 69 72 82 85 80 86 74   Temp 36.2 °C (97.2 °F) 36.2 °C (97.2 °F)        Resp 18         Height 1.511 m (4' 11.49\") 1.499 m (4' 11\") 1.499 m (4' 11\") 1.499 m (4' 11\")   1.524 m (5')   Weight (lb) 197.53 190.13 195.8 197 194 204 211   BMI 39.24 kg/m2 38.4 kg/m2 39.55 kg/m2 39.79 kg/m2 39.18 kg/m2 41.2 kg/m2 41.21 kg/m2   BSA (m2) 1.94 m2 1.89 m2 1.92 m2 1.93 m2 1.91 m2 1.96 m2 2.01 m2   Visit Report   Report Report Report Report Report       Physical Exam  General: Well appearing young woman with trisomy 21, conversational, answer questions, in no acute " distress  HEENT: EOMI. Cerumen bilaterally  CV: RRR, no murmurs  Resp: Lungs CTAB, normal work of breathing  GI: Soft, nondistended  Skin: Warm, dry,  fine papular seborrhea on forehead   Neuro: Awake, alert, moving all 4 extremities  Psych: Appropriate mood and affect      Assessment/Plan   Maricruz Estrada is a 24 y.o. female  with past medical history of CKD II, VSD, Down syndrome, obesity, and sleep apnea presenting for Follow-up.    -ENT referral for cerumen impaction, Annelise Means    -Ketoconazole cream for seborrhea on face  -Can continue the ketoconazole shampoo as well

## 2025-06-03 DIAGNOSIS — L21.9 SEBORRHEA: ICD-10-CM

## 2025-06-05 RX ORDER — KETOCONAZOLE 20 MG/G
CREAM TOPICAL 2 TIMES DAILY
Qty: 60 G | Refills: 0 | Status: SHIPPED | OUTPATIENT
Start: 2025-06-05 | End: 2025-07-03

## 2025-06-13 ENCOUNTER — APPOINTMENT (OUTPATIENT)
Dept: PRIMARY CARE | Facility: CLINIC | Age: 25
End: 2025-06-13
Payer: MEDICARE

## 2025-06-13 VITALS
WEIGHT: 207 LBS | OXYGEN SATURATION: 97 % | HEIGHT: 60 IN | HEART RATE: 80 BPM | BODY MASS INDEX: 40.64 KG/M2 | SYSTOLIC BLOOD PRESSURE: 100 MMHG | DIASTOLIC BLOOD PRESSURE: 65 MMHG

## 2025-06-13 DIAGNOSIS — J30.2 SEASONAL ALLERGIES: ICD-10-CM

## 2025-06-13 DIAGNOSIS — Q21.0 VSD (VENTRICULAR SEPTAL DEFECT) (HHS-HCC): ICD-10-CM

## 2025-06-13 DIAGNOSIS — Z76.89 ENCOUNTER TO ESTABLISH CARE: Primary | ICD-10-CM

## 2025-06-13 DIAGNOSIS — E55.9 VITAMIN D DEFICIENCY: ICD-10-CM

## 2025-06-13 DIAGNOSIS — Z23 NEED FOR TDAP VACCINATION: ICD-10-CM

## 2025-06-13 DIAGNOSIS — G47.33 OBSTRUCTIVE SLEEP APNEA SYNDROME: ICD-10-CM

## 2025-06-13 DIAGNOSIS — L21.9 SEBORRHEIC DERMATITIS: ICD-10-CM

## 2025-06-13 PROCEDURE — 3008F BODY MASS INDEX DOCD: CPT

## 2025-06-13 PROCEDURE — 99214 OFFICE O/P EST MOD 30 MIN: CPT

## 2025-06-13 ASSESSMENT — ENCOUNTER SYMPTOMS
DIARRHEA: 0
EYE PAIN: 0
BACK PAIN: 0
NUMBNESS: 0
POLYDIPSIA: 0
RHINORRHEA: 0
DIZZINESS: 0
SEIZURES: 0
ABDOMINAL PAIN: 0
HEMATURIA: 0
CHEST TIGHTNESS: 0
DIFFICULTY URINATING: 0
EYE REDNESS: 0
NERVOUS/ANXIOUS: 0
NECK PAIN: 0
WHEEZING: 0
WEAKNESS: 0
NAUSEA: 0
CHILLS: 0
OCCASIONAL FEELINGS OF UNSTEADINESS: 0
BLOOD IN STOOL: 0
FEVER: 0
FLANK PAIN: 0
CONFUSION: 0
VOMITING: 0
EYE ITCHING: 0
SHORTNESS OF BREATH: 0
SINUS PRESSURE: 0
CONSTIPATION: 0
LOSS OF SENSATION IN FEET: 0
COUGH: 0
HEADACHES: 0
DEPRESSION: 0
PALPITATIONS: 0

## 2025-06-13 ASSESSMENT — PATIENT HEALTH QUESTIONNAIRE - PHQ9
SUM OF ALL RESPONSES TO PHQ9 QUESTIONS 1 AND 2: 0
2. FEELING DOWN, DEPRESSED OR HOPELESS: NOT AT ALL
1. LITTLE INTEREST OR PLEASURE IN DOING THINGS: NOT AT ALL

## 2025-06-13 ASSESSMENT — PAIN SCALES - GENERAL: PAINLEVEL_OUTOF10: 0-NO PAIN

## 2025-06-13 NOTE — ASSESSMENT & PLAN NOTE
-can continue with claritin as needed. New prescription sent to pharmacy.  Orders:    loratadine (Claritin) 10 mg tablet; Take 1 tablet (10 mg) by mouth once daily.

## 2025-06-13 NOTE — PROGRESS NOTES
Subjective   Patient ID: Maricruz Estrada is a 24 y.o. female who presents for Establish Care (NPV).  HPI  Here with mom, Sofie, who provides the history   previous PCP -luis felipe     single, lives at home with mom and dad- has a pitbull at home- milla. Has 3 brothers - twins 34 yo, and 31 yo.  Used to work at [employer]- talking to  about getting into day program- looking into working at a restaurant - graduated in 2021 from [employer]   specialists -nephrology- [facility]- dr weir,  sleep medicine, ENT, cadio, endo, derm- Marg[facility] Vlastrosina    [employer]D dental and vision UTD glasses     PMhx significant medical hx  -hx trisomy 21  -vitamin d deficiency- on vitamin d3 daily  -seasonal allergies- on claritin as needed   -hx ckd- followed by nephrology [facility]-bethel weir     -sushma- just had sleep study done couple months ago followed by [facility]- on cpap every night.  -hx vsd- followed by cardiology [facility]- Benjamín Thomas - still open, functionally tiny, under observation- sees them every 5 years per cardiology   -Obesity- be well kids at [facility]- due to see them- Dr. Nicole Barfield, shreyas working on  diet and exercise with mom.   -seborrheic dermatitis scalp- needs refill on keotoconazole cream and shampoo- had a flare up 2 weeks ago- mom tries to use it every 2 weeks - with relief-  also sees derm     PShx: T & A-1 year, tympanostomy tubes   Social hx: etoh no, no tobacco use, no illicit drug use   Watches diet- no sugar and exercise walks, weights sometimes    Immunizations- due for tdap vaccine today - pt and mother advised and educated- agreed. Not able to receive today due to time constraint- mom states they have another appt to go to and will get it next time at complete physical   LMP- end of May - mom states they are regular  Allergies- seasonal   Screenings- pap due- discussed risks and benefits, patient has never been sexually active, will continue discussion- offered gyn referral- mom is  interested    FMhx: mother- 3 brain aneurysms, in remission from breast cancer father- prostate, colon cancer 2017- pancreatic cancer 2022   Maternal grandmother- breast cancer, diabetes   Paternal grandfather - colon cancer  Paternal grandmother- nothing specific per mom   Past medical, surgical, social, and family history all reviewed     patient states feeling well, no complaints at this time   denies N/V, headache, dizziness, CP, SOB, palpitations, edema, numbness, tingling, weakness      Review of Systems   Constitutional:  Negative for chills and fever.   HENT:  Negative for congestion, rhinorrhea and sinus pressure.    Eyes:  Negative for pain, redness and itching.   Respiratory:  Negative for cough, chest tightness, shortness of breath and wheezing.    Cardiovascular:  Negative for chest pain and palpitations.   Gastrointestinal:  Negative for abdominal pain, blood in stool, constipation, diarrhea, nausea and vomiting.   Endocrine: Negative for cold intolerance, heat intolerance, polydipsia and polyuria.   Genitourinary:  Negative for difficulty urinating, flank pain, hematuria and urgency.   Musculoskeletal:  Negative for back pain and neck pain.   Skin:  Negative for rash.   Neurological:  Negative for dizziness, seizures, syncope, weakness, numbness and headaches.   Psychiatric/Behavioral:  Negative for confusion. The patient is not nervous/anxious.        Objective     /65 (BP Location: Right arm, Patient Position: Sitting, BP Cuff Size: Large adult)   Pulse 80   Ht 1.524 m (5')   Wt 93.9 kg (207 lb)   SpO2 97%   BMI 40.43 kg/m²     Physical Exam  Vitals and nursing note reviewed.   Constitutional:       Appearance: Normal appearance. She is obese.   HENT:      Head: Normocephalic and atraumatic.   Cardiovascular:      Rate and Rhythm: Normal rate and regular rhythm.      Pulses: Normal pulses.      Heart sounds: Normal heart sounds.   Pulmonary:      Effort: Pulmonary effort is normal.       Breath sounds: Normal breath sounds.   Abdominal:      General: Bowel sounds are normal.      Palpations: Abdomen is soft.      Tenderness: There is no abdominal tenderness. There is no guarding.   Genitourinary:     Comments: Defer    Musculoskeletal:         General: Normal range of motion.      Cervical back: Normal range of motion.   Skin:     General: Skin is warm and dry.   Neurological:      Mental Status: She is alert.     Neuro: awake, alert and moving all 4 extremities   Psych: appropriate mood and affect     Assessment & Plan  Seasonal allergies  -can continue with claritin as needed. New prescription sent to pharmacy.  Orders:    loratadine (Claritin) 10 mg tablet; Take 1 tablet (10 mg) by mouth once daily.    Encounter to establish care  -please schedule a complete physical at your earliest convenience.   Orders:    Referral to Gynecology; Future    Need for Tdap vaccination  -pt unable to receive today d/t time constraint- educated to receive this soon- will receive at next visit .       VSD (ventricular septal defect) ([facility])  -follow up with cardiology        Seborrheic dermatitis  -can use ketoconazole cream and shampoo as needed. Follow up with derm     complexity visit of 35+  minutes face-to-face with at least half of the time discussing  Results of my examination, clinical findings, impression and diagnoses discussed with the patient as well as results of the latest test/lab work. The patient was in agreement with the plan and verbalized a good understanding of instructions and plans for treatment. At the end of the visit today, the patient felt that all questions had been answered satisfactorily. The patient was pleased with the visit and very appreciative for the care rendered.      -go to er with any new or worsening symptoms.  -Patient and mother are in agreement with plan .  Orders:    ketoconazole (NIZOral) 2 % cream; Apply topically 2 times a day for 28 days.    ketoconazole (NIZOral) 2  % shampoo; Apply topically 2 times a week for 14 days. SHAMPOO HAIR/SCALP AS DIRECTED    Obstructive sleep apnea syndrome  -continue with cpap        Vitamin D deficiency    Orders:    cholecalciferol (Vitamin D-3) 50 mcg (2,000 units) capsule; Take 1 capsule (50 mcg) by mouth early in the morning.. TAKE 1 CAPSULE BY MOUTH DAILY              Carol Driver PA-C 06/15/25 3:40 PM

## 2025-06-15 RX ORDER — KETOCONAZOLE 20 MG/ML
SHAMPOO, SUSPENSION TOPICAL 2 TIMES WEEKLY
Qty: 120 ML | Refills: 3 | Status: SHIPPED | OUTPATIENT
Start: 2025-06-16 | End: 2025-06-30

## 2025-06-15 RX ORDER — KETOCONAZOLE 20 MG/G
CREAM TOPICAL 2 TIMES DAILY
Qty: 60 G | Refills: 0 | Status: SHIPPED | OUTPATIENT
Start: 2025-06-15 | End: 2025-07-13

## 2025-06-15 RX ORDER — LORATADINE 10 MG/1
10 TABLET ORAL DAILY
Qty: 30 TABLET | Refills: 2 | Status: SHIPPED | OUTPATIENT
Start: 2025-06-15 | End: 2025-09-13

## 2025-06-15 RX ORDER — ACETAMINOPHEN 500 MG
50 TABLET ORAL DAILY
Qty: 90 CAPSULE | Refills: 1 | Status: SHIPPED | OUTPATIENT
Start: 2025-06-15

## 2025-07-14 ENCOUNTER — APPOINTMENT (OUTPATIENT)
Facility: CLINIC | Age: 25
End: 2025-07-14
Payer: MEDICARE

## 2025-10-20 ENCOUNTER — APPOINTMENT (OUTPATIENT)
Dept: PRIMARY CARE | Facility: CLINIC | Age: 25
End: 2025-10-20
Payer: MEDICARE